# Patient Record
Sex: MALE | URBAN - METROPOLITAN AREA
[De-identification: names, ages, dates, MRNs, and addresses within clinical notes are randomized per-mention and may not be internally consistent; named-entity substitution may affect disease eponyms.]

---

## 2024-08-30 ENCOUNTER — TELEPHONE (OUTPATIENT)
Age: 39
End: 2024-08-30

## 2024-08-30 NOTE — TELEPHONE ENCOUNTER
Referral for 52.9ICD-10-CMNoninfective gastroenteritis and colitis, unspecified    R63.4ICD-10-CMAbnormal weight loss. Please review and advise.      Referral  Referral # [id number]  Referral Information    Referral # Creation Date Referral Status Status Update    [id number] 08/30/2024 Pending Review 08/30/2024: Status History     Status Reason Referral Type Referral Reasons Referral Class   Other Eval and Treat none Incoming     To Specialty To Provider To Location/Place of Service To Department   none Damon Yepez MD none none     To Vendor Referred By By Location/Place of Service By Department   none Arturo Lees MD none none     Priority Start Date Expiration Date Referral Entered By   Routine 08/15/2024 08/15/2025 Fuad Wright     Visits Requested Visits Authorized Visits Completed Visits Scheduled   1 1       Coverages    No coverages found.  Referral Information    Referral # Creation Date Referral Status Status Update    [id number] 08/30/2024 Pending Review 08/30/2024: Status History     Status Reason Referral Type Referral Reasons Referral Class   Other Eval and Treat none Incoming     To Specialty To Provider To Location/Place of Service To Department   none Damon Yepez MD none none     To Vendor Referred By By Location/Place of Service By Department   none Arturo Lees MD none none     Priority Start Date Expiration Date Referral Entered By   Routine 08/15/2024 08/15/2025 Fuad Wright     Visits Requested Visits Authorized Visits Completed Visits Scheduled   1 1       Procedure Information    Service Details  Procedure Modifiers Provider Requested Approved   REF25 - AMB REFERRAL TO GASTROENTEROLOGY none Damon Yepez MD 2 2     Diagnosis Information    Diagnosis   K52.9 (ICD-10-CM) - Noninfective gastroenteritis and colitis, unspecified   R63.4 (ICD-10-CM) - Abnormal weight loss     Service-Level Authorizations    No service level authorizations were found.  Referral Notes  Number of  Notes: 1  .  Type Date User Summary Attachment   Specialty Comments 08/30/2024 10:56 AM Fuad Wright Sent to provider to review. -   Note:  Sent to provider to review.   Triage Information    Decision: (none)   Priority: Routine   Schedule by date: 9/29/2024     Scheduling Instructions           Service-Level Authorizations    No service level authorizations were found.